# Patient Record
Sex: FEMALE | URBAN - METROPOLITAN AREA
[De-identification: names, ages, dates, MRNs, and addresses within clinical notes are randomized per-mention and may not be internally consistent; named-entity substitution may affect disease eponyms.]

---

## 2022-10-05 ENCOUNTER — APPOINTMENT (OUTPATIENT)
Dept: URBAN - METROPOLITAN AREA CLINIC 251 | Age: 67
Setting detail: DERMATOLOGY
End: 2022-10-05

## 2022-10-05 VITALS — WEIGHT: 160 LBS | HEIGHT: 63 IN
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
## 2022-10-05 DIAGNOSIS — L259 CONTACT DERMATITIS AND OTHER ECZEMA, UNSPECIFIED CAUSE: ICD-10-CM

## 2022-10-05 DIAGNOSIS — L21.8 OTHER SEBORRHEIC DERMATITIS: ICD-10-CM

## 2022-10-05 PROBLEM — L23.9 ALLERGIC CONTACT DERMATITIS, UNSPECIFIED CAUSE: Status: ACTIVE | Noted: 2022-10-05
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
## 2022-10-05 PROCEDURE — OTHER COUNSELING: OTHER

## 2022-10-05 PROCEDURE — 99203 OFFICE O/P NEW LOW 30 MIN: CPT

## 2022-10-05 PROCEDURE — OTHER PREDNISONE TAPER: OTHER

## 2022-10-05 PROCEDURE — OTHER PRESCRIPTION MEDICATION MANAGEMENT: OTHER

## 2022-10-05 PROCEDURE — OTHER SUNSCREEN RECOMMENDATIONS: OTHER

## 2022-10-05 PROCEDURE — OTHER MIPS QUALITY: OTHER

## 2022-10-05 PROCEDURE — OTHER PRESCRIPTION: OTHER

## 2022-10-05 RX ORDER — KETOCONAZOLE 20 MG/G
2% CREAM TOPICAL
Qty: 30 | Refills: 1 | Status: ERX | COMMUNITY
Start: 2022-10-05

## 2022-10-05 RX ORDER — PREDNISONE 10 MG/1
10MG TABLET ORAL AS DIRECTED
Qty: 30 | Refills: 0 | Status: ERX | COMMUNITY
Start: 2022-10-05

## 2022-10-05 ASSESSMENT — SEVERITY ASSESSMENT
HOW SEVERE IS THIS PATIENT'S CONDITION?: MILD
SEVERITY: MODERATE

## 2022-10-05 ASSESSMENT — PAIN INTENSITY VAS: HOW INTENSE IS YOUR PAIN 0 BEING NO PAIN, 10 BEING THE MOST SEVERE PAIN POSSIBLE?: NO PAIN

## 2022-10-05 NOTE — PROCEDURE: PREDNISONE TAPER
Taper Start Date (Required To Display Calendar Of Dosages): 10/05/2022
Taper Details (Required To Display Calendar Of Dosages): 40-30-20-10 dropping every 3 days (12 days with 10mg tablets)
Detail Level: Zone

## 2022-10-05 NOTE — PROCEDURE: PRESCRIPTION MEDICATION MANAGEMENT
Detail Level: Simple
Render In Strict Bullet Format?: No
Initiate Treatment: prednisone 10 mg tablet As directed\\nQuantity: 30.0 Tablet  Days Supply: 18\\nSig: Take 40mg (4 tablets)x 3 days, 30mg (3 tablets)x 3 days, 20mg(2 tablets)x 3 days and 10mg (1 tablets)for 3 days then stop
Initiate Treatment: ketoconazole 2 % topical cream qd x1 month\\nQuantity: 30.0 g  Days Supply: 30\\nSig: Apply to affected area on ear once daily for one month.